# Patient Record
Sex: FEMALE
[De-identification: names, ages, dates, MRNs, and addresses within clinical notes are randomized per-mention and may not be internally consistent; named-entity substitution may affect disease eponyms.]

---

## 2018-01-11 ENCOUNTER — HOSPITAL ENCOUNTER (EMERGENCY)
Dept: HOSPITAL 88 - ER | Age: 28
Discharge: ANOTHER HOSPITAL | End: 2018-01-11
Payer: MEDICARE

## 2018-01-11 VITALS — HEIGHT: 63 IN | WEIGHT: 125 LBS | BODY MASS INDEX: 22.15 KG/M2

## 2018-01-11 DIAGNOSIS — R07.89: Primary | ICD-10-CM

## 2018-01-11 LAB
BACTERIA URNS QL MICRO: (no result) /HPF
BILIRUB UR QL: NEGATIVE
CLARITY UR: (no result)
COLOR UR: YELLOW
DEPRECATED RBC URNS MANUAL-ACNC: (no result) /HPF (ref 0–5)
EPI CELLS URNS QL MICRO: (no result) /LPF
KETONES UR QL STRIP.AUTO: NEGATIVE
LEUKOCYTE ESTERASE UR QL STRIP.AUTO: (no result)
NITRITE UR QL STRIP.AUTO: NEGATIVE
NON-SQ EPI CELLS URNS QL MICRO: (no result)
PROT UR QL STRIP.AUTO: (no result)
RENAL EPI CELLS URNS QL MICRO: (no result)
SP GR UR STRIP: 1.01 (ref 1.01–1.02)
UROBILINOGEN UR STRIP-MCNC: 0.2 MG/DL (ref 0.2–1)
WBC #/AREA URNS HPF: (no result) /HPF (ref 0–5)

## 2018-01-11 PROCEDURE — 71020: CPT

## 2018-01-11 PROCEDURE — 93005 ELECTROCARDIOGRAM TRACING: CPT

## 2018-01-11 PROCEDURE — 87086 URINE CULTURE/COLONY COUNT: CPT

## 2018-01-11 PROCEDURE — 81001 URINALYSIS AUTO W/SCOPE: CPT

## 2018-01-11 NOTE — DIAGNOSTIC IMAGING REPORT
PROCEDURE: X-RAY CHEST, TWO VIEWS

COMPARISON: None.

INDICATIONS: CHEST PAIN

 

FINDINGS:



LUNGS: Well-inflated. No mass or infiltrate.The pulmonary vascular 

markings are normal.

 

PLEURA: No effusions or pneumothorax.

 

HEART \T\ 

MEDIASTINUM: The heart is top normal in size. No hilar lymphadenopathy.

 

BONES \T\

SOFT TISSUES: No focal osseous lesions. The soft tissues are 

unremarkable.

 

 

CONCLUSION:

 

No acute cardiopulmonary process. 

 

Dictated by:  Yesi Pérez M.D. on 1/11/2018 at 15:31     

Electronically approved by:  Yesi Pérez M.D. on 1/11/2018 at 

15:31